# Patient Record
(demographics unavailable — no encounter records)

---

## 2025-01-28 NOTE — ASSESSMENT
[FreeTextEntry1] : Left X-Ray Examination of the SHOULDER (2 views):  No fractures, subluxations or dislocations. GH deg changes X-Ray Examination of the SCAPULA 1 or 2 views shows: No significant abnormalities.  Acromial spur.   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 Surgical risks include but are not limited to pain, infection, bleeding, vascular injury, numbness, tingling, nerve damage. - Due to risks of surgery, they will continue conservative treatment with PT, icing, and anti-inflammatory medications - The patient was provided with a prescription to work on scapular strengthening and rotator cuff strengthening. - The patient was advised to let pain guide the gradual advancement of activities. - The patient was advised to apply ice (wrapped in a towel or protective covering) to the area daily (20 minutes at a time, 2-4X/day). - We also discussed the possible of a corticosteroid injection in order to help decrease inflammation and pain so that they can perform better therapy. - The risks, benefits, and alternatives to corticosteroid injection were reviewed with the patient and they wished to proceed with this treatment course. - Follow up as needed in 6 weeks to re-evaluate progress with therapy - if not better, will get mri to eval RTC tearing   Medication Discussion: 1) We discussed a comprehensive treatment plan that included possible pharmaceutical management involving the use of prescription strength medications including but not limited to options such as oral Naprosyn 500mg BID, once daily Meloxicam 15 mg, or 500-650 mg Tylenol versus over the counter oral medications in addition to discussing possible topical prescription Pennsaid vs  Voltaren gel. 2) There is a moderate risk of morbidity with further treatment, especially from use of prescription strength medications and possible side effects of these medications which include but are not limited to upset stomach with oral medications, skin reactions to topical medications and GI/cardiac/renal issues with long term use. 3) I recommended that the patient follow-up with their medical physician if there are any significant potential issues with long term medication use such as interactions with current medications or with exacerbation of underlying medical comorbidities. 4) The benefits and risks associated with use of oral and / or topical prescription and over the counter anti-inflammatory medications were discussed with the patient. The patient voiced understanding of the risks including but not limited to bleeding, stroke, kidney dysfunction, heart disease, and were referred to the black box warning label for further information.

## 2025-01-28 NOTE — IMAGING
[de-identified] :  LEFT SHOULDER Inspection: No swelling.  Palpation: Tenderness is noted at the bicipital groove, anterior and lateral.  Range of motion: There is pain with range of motion. , ER 55, @90ER 90, @90IR 30 Strength: There is pain, weakness, and discomfort with strength testing. Forward Flexion 3/5. Abduction 3/5.  External Rotation 4/5 and Internal Rotation 5-/5  Neurological testings: motor and sensor intact distally. Ligament Stability and Special Tests:  There is positive arc of pain.  Shoulder apprehension: neg Shoulder relocation: neg Obriens test: pos Biceps Active test: neg Quinn Labral Shear: neg Impingement testing: pos Srinivas testing: pos Whipple: pos Cross Body Adduction: neg

## 2025-01-28 NOTE — HISTORY OF PRESENT ILLNESS
[de-identified] : 65 year old female  ( RHD, Edita Emanuel's mom, Golf)   left shoulder pain since 1/18/2025 while making bed, moved shoulder awkwardly and felt a pull The pain is located  lateral and anterior The pain is associated with  clicking, weaknesss Worse with activity and better at rest. Has tried Tyleonol, PT at [facility] in [place] PMHx 1 kidney -- no nsaids, Hypothyroidism L shoulder 2009 RCR, [facility] Labrum Dr. Allan HSS, R shoulder RCR Dr. Deng Suarez HSS 2015 **can't take NSAIDs,. pt has 1 kidney**